# Patient Record
Sex: MALE | URBAN - METROPOLITAN AREA
[De-identification: names, ages, dates, MRNs, and addresses within clinical notes are randomized per-mention and may not be internally consistent; named-entity substitution may affect disease eponyms.]

---

## 2017-10-05 ENCOUNTER — OFFICE VISIT (OUTPATIENT)
Dept: FAMILY MEDICINE | Facility: CLINIC | Age: 22
End: 2017-10-05

## 2017-10-05 VITALS
SYSTOLIC BLOOD PRESSURE: 145 MMHG | HEART RATE: 85 BPM | HEIGHT: 75 IN | WEIGHT: 170 LBS | BODY MASS INDEX: 21.14 KG/M2 | DIASTOLIC BLOOD PRESSURE: 79 MMHG

## 2017-10-05 DIAGNOSIS — Z02.5 ROUTINE SPORTS PHYSICAL EXAM: Primary | ICD-10-CM

## 2017-10-05 ASSESSMENT — ANXIETY QUESTIONNAIRES
1. FEELING NERVOUS, ANXIOUS, OR ON EDGE: MORE THAN HALF THE DAYS
5. BEING SO RESTLESS THAT IT IS HARD TO SIT STILL: NOT AT ALL
IF YOU CHECKED OFF ANY PROBLEMS ON THIS QUESTIONNAIRE, HOW DIFFICULT HAVE THESE PROBLEMS MADE IT FOR YOU TO DO YOUR WORK, TAKE CARE OF THINGS AT HOME, OR GET ALONG WITH OTHER PEOPLE: NOT DIFFICULT AT ALL
3. WORRYING TOO MUCH ABOUT DIFFERENT THINGS: MORE THAN HALF THE DAYS
6. BECOMING EASILY ANNOYED OR IRRITABLE: NOT AT ALL
2. NOT BEING ABLE TO STOP OR CONTROL WORRYING: MORE THAN HALF THE DAYS
7. FEELING AFRAID AS IF SOMETHING AWFUL MIGHT HAPPEN: NOT AT ALL
GAD7 TOTAL SCORE: 6

## 2017-10-05 ASSESSMENT — PATIENT HEALTH QUESTIONNAIRE - PHQ9
SUM OF ALL RESPONSES TO PHQ QUESTIONS 1-9: 1
5. POOR APPETITE OR OVEREATING: NOT AT ALL

## 2017-10-05 NOTE — PROGRESS NOTES
During today's visit, I was present in the room to review the history and the pertinent parts of the exam. I agree with the above assessment and plan as documented by the fellow.  Supervising physician: Chas Conrad

## 2017-10-05 NOTE — LETTER
"  10/5/2017      RE: Sarabjit Hackett  [id number] [facility] [address]       Sarabjit Hackett  Vitals: /79  Pulse 85  Ht 1.905 m (6' 3\")  Wt 77.1 kg (170 lb)  BMI 21.25 kg/m2  BMI= Body mass index is 21.25 kg/(m^2).  Sport(s): Womens  team    Vision: Right Eye: 20/20 Left Eye: 20/20 Both Eyes: 20/20  Correction: none  Pupils: equal    Mouth Guard: Yes  Sickle Cell Trait: Discussed  Concussions: Concussion fact sheet reviewed. Student Athlete gave written and verbal agreement to report any suspected concussions.    General/Medical  Eyes/Vision: Normal  Ears/Hearing: Normal  Nose: Normal  Mouth/Dental: Normal  Throat: Normal  Thyroid: Normal  Lymph Nodes: Normal  Lungs: Normal  Abdomen: Normal  Genitourinary (males only, not performed if female): Normal  Hernia: Normal  Skin: Normal    Musculoskeletal/Orthopaedic  Neck/Cervical: Normal  Thoracic/Lumbar: Normal  Shoulder/Upper Arm: Normal  Elbow/Forearm: Normal  Wrist/Hand/Fingers: Normal  Hip/Thigh: Normal  Knee/Patella: Normal  Lower Leg/Ankles: Normal  Foot/Toes: Normal    Cardiovascular Screening  S1 S2 WNL, no m/r/g with standard listening nor with valsalva  Heart Murmur:No Grade: NA  Symmetric Femoral pulses: Yes    Stigmata of Marfan's Syndrome - if appropriate:  Not applicable    COMMENTS, RECOMMENDATIONS and PARTICIPATION STATUS  Cleared      During today's visit, I was present in the room to review the history and the pertinent parts of the exam. I agree with the above assessment and plan as documented by the fellow.  Supervising physician: Chas Wallace  [facility]      Chas Wallace MD    "

## 2017-10-05 NOTE — LETTER
Date:October 6, 2017      Patient was self referred, no letter generated. Do not send.        [facility] Health Information

## 2017-10-05 NOTE — MR AVS SNAPSHOT
"              After Visit Summary   10/5/2017    Sarabjit Hackett    MRN: [id number]           Patient Information     Date Of Birth          1995        Visit Information        Provider Department      10/5/2017 10:30 AM Chas Wallace MD [facility]        Today's Diagnoses     Routine sports physical exam    -  1       Follow-ups after your visit        Who to contact     Please call your clinic at [phone] to:    Ask questions about your health    Make or cancel appointments    Discuss your medicines    Learn about your test results    Speak to your doctor   If you have compliments or concerns about an experience at your clinic, or if you wish to file a complaint, please contact [facility] Patient Relations at [phone] or email us at [email or web address]         Additional Information About Your Visit        MyChart Information     [employer] is an electronic gateway that provides easy, online access to your medical records. With Reacci[employer], you can request a clinic appointment, read your test results, renew a prescription or communicate with your care team.     To sign up for Re[employer] visit the website at [email or web address]   You will be asked to enter the access code listed below, as well as some personal information. Please follow the directions to create your username and password.     Your access code is: [email or web address]  Expires: 1/3/2018  4:59 PM     Your access code will  in 90 days. If you need help or a new code, please contact your [facility] or call [phone] for assistance.        Care EveryWhere ID     This is your Care EveryWhere ID. This could be used by other organizations to access your [place] medical records  [id number]        Your Vitals Were     Pulse Height BMI (Body Mass Index)             85 1.905 m (6' 3\") 21.25 kg/m2          Blood Pressure from Last 3 Encounters:   10/05/17 " 145/79    Weight from Last 3 Encounters:   10/05/17 77.1 kg (170 lb)              Today, you had the following     No orders found for display       Primary Care Provider    None Specified       No primary provider on file.        Equal Access to Services     VIRIDIANA FAIRCHILD : Hadii aad ku hadcandelario Ram, gayda lukyara, troyta kaswapnada brandon, aarti huertadeandre gonzalezlauren kasper jonh harrington. So [facility] [phone].    ATENCIÓN: Si habla español, tiene a morales disposición servicios gratuitos de asistencia lingüística. Llame al [phone].    We comply with applicable federal civil rights laws and [place] laws. We do not discriminate on the basis of race, color, national origin, age, disability, sex, sexual orientation, or gender identity.            Thank you!     Thank you for choosing [facility]  for your care. Our goal is always to provide you with excellent care. Hearing back from our patients is one way we can continue to improve our services. Please take a few minutes to complete the written survey that you may receive in the mail after your visit with us. Thank you!             Your Updated Medication List - Protect others around you: Learn how to safely use, store and throw away your medicines at www.disposemymeds.org.      Notice  As of 10/5/2017  4:59 PM    You have not been prescribed any medications.

## 2017-10-06 ASSESSMENT — ANXIETY QUESTIONNAIRES: GAD7 TOTAL SCORE: 6

## 2019-01-01 NOTE — PROGRESS NOTES
"Sarabjit Hackett  Vitals: /79  Pulse 85  Ht 1.905 m (6' 3\")  Wt 77.1 kg (170 lb)  BMI 21.25 kg/m2  BMI= Body mass index is 21.25 kg/(m^2).  Sport(s): Womens  team    Vision: Right Eye: 20/20 Left Eye: 20/20 Both Eyes: 20/20  Correction: none  Pupils: equal    Mouth Guard: Yes  Sickle Cell Trait: Discussed  Concussions: Concussion fact sheet reviewed. Student Athlete gave written and verbal agreement to report any suspected concussions.    General/Medical  Eyes/Vision: Normal  Ears/Hearing: Normal  Nose: Normal  Mouth/Dental: Normal  Throat: Normal  Thyroid: Normal  Lymph Nodes: Normal  Lungs: Normal  Abdomen: Normal  Genitourinary (males only, not performed if female): Normal  Hernia: Normal  Skin: Normal    Musculoskeletal/Orthopaedic  Neck/Cervical: Normal  Thoracic/Lumbar: Normal  Shoulder/Upper Arm: Normal  Elbow/Forearm: Normal  Wrist/Hand/Fingers: Normal  Hip/Thigh: Normal  Knee/Patella: Normal  Lower Leg/Ankles: Normal  Foot/Toes: Normal    Cardiovascular Screening  S1 S2 WNL, no m/r/g with standard listening nor with valsalva  Heart Murmur:No Grade: NA  Symmetric Femoral pulses: Yes    Stigmata of Marfan's Syndrome - if appropriate:  Not applicable    COMMENTS, RECOMMENDATIONS and PARTICIPATION STATUS  Cleared    " 0.21